# Patient Record
Sex: MALE | Race: WHITE | NOT HISPANIC OR LATINO
[De-identification: names, ages, dates, MRNs, and addresses within clinical notes are randomized per-mention and may not be internally consistent; named-entity substitution may affect disease eponyms.]

---

## 2017-01-16 ENCOUNTER — LABORATORY RESULT (OUTPATIENT)
Age: 62
End: 2017-01-16

## 2017-01-17 ENCOUNTER — APPOINTMENT (OUTPATIENT)
Dept: FAMILY MEDICINE | Facility: CLINIC | Age: 62
End: 2017-01-17

## 2017-01-17 VITALS — DIASTOLIC BLOOD PRESSURE: 80 MMHG | SYSTOLIC BLOOD PRESSURE: 140 MMHG

## 2017-01-17 DIAGNOSIS — Z00.00 ENCOUNTER FOR GENERAL ADULT MEDICAL EXAMINATION W/OUT ABNORMAL FINDINGS: ICD-10-CM

## 2017-01-19 ENCOUNTER — LABORATORY RESULT (OUTPATIENT)
Age: 62
End: 2017-01-19

## 2017-01-27 ENCOUNTER — APPOINTMENT (OUTPATIENT)
Dept: FAMILY MEDICINE | Facility: CLINIC | Age: 62
End: 2017-01-27

## 2017-01-31 ENCOUNTER — APPOINTMENT (OUTPATIENT)
Dept: FAMILY MEDICINE | Facility: CLINIC | Age: 62
End: 2017-01-31

## 2017-01-31 VITALS — DIASTOLIC BLOOD PRESSURE: 78 MMHG | RESPIRATION RATE: 18 BRPM | SYSTOLIC BLOOD PRESSURE: 144 MMHG | HEART RATE: 74 BPM

## 2017-02-10 LAB
PSA FREE FLD-MCNC: 11.2 %
PSA FREE SERPL-MCNC: 1.07 NG/ML
PSA SERPL-MCNC: 9.54 NG/ML

## 2017-02-15 ENCOUNTER — RX RENEWAL (OUTPATIENT)
Age: 62
End: 2017-02-15

## 2017-04-05 ENCOUNTER — APPOINTMENT (OUTPATIENT)
Dept: MRI IMAGING | Facility: CLINIC | Age: 62
End: 2017-04-05

## 2017-04-05 ENCOUNTER — OUTPATIENT (OUTPATIENT)
Dept: OUTPATIENT SERVICES | Facility: HOSPITAL | Age: 62
LOS: 1 days | End: 2017-04-05
Payer: COMMERCIAL

## 2017-04-05 DIAGNOSIS — R97.20 ELEVATED PROSTATE SPECIFIC ANTIGEN [PSA]: ICD-10-CM

## 2017-04-05 PROCEDURE — 72197 MRI PELVIS W/O & W/DYE: CPT

## 2017-04-05 PROCEDURE — A9585: CPT

## 2017-04-05 PROCEDURE — 82565 ASSAY OF CREATININE: CPT

## 2017-04-17 ENCOUNTER — RX RENEWAL (OUTPATIENT)
Age: 62
End: 2017-04-17

## 2017-06-12 ENCOUNTER — RX RENEWAL (OUTPATIENT)
Age: 62
End: 2017-06-12

## 2017-06-26 ENCOUNTER — RX RENEWAL (OUTPATIENT)
Age: 62
End: 2017-06-26

## 2017-07-23 ENCOUNTER — RX RENEWAL (OUTPATIENT)
Age: 62
End: 2017-07-23

## 2017-08-08 ENCOUNTER — APPOINTMENT (OUTPATIENT)
Dept: FAMILY MEDICINE | Facility: CLINIC | Age: 62
End: 2017-08-08

## 2017-08-08 ENCOUNTER — APPOINTMENT (OUTPATIENT)
Dept: FAMILY MEDICINE | Facility: CLINIC | Age: 62
End: 2017-08-08
Payer: COMMERCIAL

## 2017-08-08 ENCOUNTER — RX RENEWAL (OUTPATIENT)
Age: 62
End: 2017-08-08

## 2017-08-08 VITALS
HEART RATE: 70 BPM | BODY MASS INDEX: 20.76 KG/M2 | RESPIRATION RATE: 15 BRPM | OXYGEN SATURATION: 98 % | TEMPERATURE: 98.4 F | DIASTOLIC BLOOD PRESSURE: 78 MMHG | HEIGHT: 70 IN | WEIGHT: 145 LBS | SYSTOLIC BLOOD PRESSURE: 130 MMHG

## 2017-08-08 DIAGNOSIS — F41.9 ANXIETY DISORDER, UNSPECIFIED: ICD-10-CM

## 2017-08-08 PROCEDURE — 99214 OFFICE O/P EST MOD 30 MIN: CPT

## 2017-08-08 RX ORDER — ALPRAZOLAM 0.5 MG/1
0.5 TABLET ORAL
Qty: 120 | Refills: 0 | Status: DISCONTINUED | OUTPATIENT
Start: 2017-02-03 | End: 2017-08-08

## 2017-08-08 RX ORDER — ALPRAZOLAM 0.5 MG/1
0.5 TABLET ORAL
Qty: 120 | Refills: 0 | Status: DISCONTINUED | COMMUNITY
Start: 2017-03-01 | End: 2017-08-08

## 2017-08-09 LAB
25(OH)D3 SERPL-MCNC: 37.3 NG/ML
ALBUMIN SERPL ELPH-MCNC: 4.7 G/DL
ALP BLD-CCNC: 43 U/L
ALT SERPL-CCNC: 27 U/L
ANION GAP SERPL CALC-SCNC: 14 MMOL/L
APPEARANCE: CLEAR
AST SERPL-CCNC: 18 U/L
BASOPHILS # BLD AUTO: 0.05 K/UL
BASOPHILS NFR BLD AUTO: 0.5 %
BILIRUB SERPL-MCNC: 0.6 MG/DL
BILIRUBIN URINE: NEGATIVE
BLOOD URINE: NEGATIVE
BUN SERPL-MCNC: 19 MG/DL
CALCIUM SERPL-MCNC: 10.2 MG/DL
CHLORIDE SERPL-SCNC: 98 MMOL/L
CHOLEST SERPL-MCNC: 151 MG/DL
CHOLEST/HDLC SERPL: 1.6 RATIO
CO2 SERPL-SCNC: 25 MMOL/L
COLOR: YELLOW
CREAT SERPL-MCNC: 0.99 MG/DL
EOSINOPHIL # BLD AUTO: 0.04 K/UL
EOSINOPHIL NFR BLD AUTO: 0.4 %
GLUCOSE QUALITATIVE U: NORMAL MG/DL
GLUCOSE SERPL-MCNC: 110 MG/DL
HBA1C MFR BLD HPLC: 5.8 %
HCT VFR BLD CALC: 48 %
HDLC SERPL-MCNC: 97 MG/DL
HGB BLD-MCNC: 15.3 G/DL
IMM GRANULOCYTES NFR BLD AUTO: 0.2 %
KETONES URINE: NEGATIVE
LDLC SERPL CALC-MCNC: 36 MG/DL
LEUKOCYTE ESTERASE URINE: NEGATIVE
LYMPHOCYTES # BLD AUTO: 1.48 K/UL
LYMPHOCYTES NFR BLD AUTO: 16.2 %
MAN DIFF?: NORMAL
MCHC RBC-ENTMCNC: 29.3 PG
MCHC RBC-ENTMCNC: 31.9 GM/DL
MCV RBC AUTO: 92 FL
MONOCYTES # BLD AUTO: 0.68 K/UL
MONOCYTES NFR BLD AUTO: 7.5 %
NEUTROPHILS # BLD AUTO: 6.84 K/UL
NEUTROPHILS NFR BLD AUTO: 75.2 %
NITRITE URINE: NEGATIVE
PH URINE: 6.5
PLATELET # BLD AUTO: 532 K/UL
POTASSIUM SERPL-SCNC: 5.2 MMOL/L
PROT SERPL-MCNC: 7.8 G/DL
PROTEIN URINE: NEGATIVE MG/DL
PSA FREE FLD-MCNC: 11.2
PSA FREE SERPL-MCNC: 1.18 NG/ML
PSA SERPL-MCNC: 10.58 NG/ML
RBC # BLD: 5.22 M/UL
RBC # FLD: 14.2 %
SODIUM SERPL-SCNC: 137 MMOL/L
SPECIFIC GRAVITY URINE: 1.02
TRIGL SERPL-MCNC: 90 MG/DL
TSH SERPL-ACNC: 1.42 UIU/ML
UROBILINOGEN URINE: NORMAL MG/DL
WBC # FLD AUTO: 9.11 K/UL

## 2017-08-11 ENCOUNTER — OTHER (OUTPATIENT)
Age: 62
End: 2017-08-11

## 2017-08-14 LAB
TESTOST BND SERPL-MCNC: 7.9 PG/ML
TESTOST SERPL-MCNC: 477 NG/DL

## 2017-08-23 ENCOUNTER — RX RENEWAL (OUTPATIENT)
Age: 62
End: 2017-08-23

## 2017-08-25 ENCOUNTER — APPOINTMENT (OUTPATIENT)
Dept: FAMILY MEDICINE | Facility: CLINIC | Age: 62
End: 2017-08-25

## 2017-08-25 ENCOUNTER — RX RENEWAL (OUTPATIENT)
Age: 62
End: 2017-08-25

## 2017-09-20 ENCOUNTER — RX RENEWAL (OUTPATIENT)
Age: 62
End: 2017-09-20

## 2017-10-18 ENCOUNTER — NON-APPOINTMENT (OUTPATIENT)
Age: 62
End: 2017-10-18

## 2017-10-18 ENCOUNTER — RX RENEWAL (OUTPATIENT)
Age: 62
End: 2017-10-18

## 2017-10-18 ENCOUNTER — APPOINTMENT (OUTPATIENT)
Dept: CARDIOLOGY | Facility: CLINIC | Age: 62
End: 2017-10-18
Payer: COMMERCIAL

## 2017-10-18 VITALS
HEIGHT: 70 IN | WEIGHT: 145 LBS | OXYGEN SATURATION: 98 % | BODY MASS INDEX: 20.76 KG/M2 | RESPIRATION RATE: 17 BRPM | SYSTOLIC BLOOD PRESSURE: 144 MMHG | HEART RATE: 69 BPM | DIASTOLIC BLOOD PRESSURE: 85 MMHG

## 2017-10-18 VITALS — SYSTOLIC BLOOD PRESSURE: 150 MMHG | DIASTOLIC BLOOD PRESSURE: 85 MMHG

## 2017-10-18 DIAGNOSIS — Z82.49 FAMILY HISTORY OF ISCHEMIC HEART DISEASE AND OTHER DISEASES OF THE CIRCULATORY SYSTEM: ICD-10-CM

## 2017-10-18 PROCEDURE — 99215 OFFICE O/P EST HI 40 MIN: CPT

## 2017-10-18 PROCEDURE — 93000 ELECTROCARDIOGRAM COMPLETE: CPT

## 2017-11-15 ENCOUNTER — RX RENEWAL (OUTPATIENT)
Age: 62
End: 2017-11-15

## 2017-11-27 ENCOUNTER — APPOINTMENT (OUTPATIENT)
Dept: CARDIOLOGY | Facility: CLINIC | Age: 62
End: 2017-11-27
Payer: COMMERCIAL

## 2017-11-27 PROCEDURE — 93306 TTE W/DOPPLER COMPLETE: CPT

## 2017-12-11 ENCOUNTER — APPOINTMENT (OUTPATIENT)
Dept: CARDIOLOGY | Facility: CLINIC | Age: 62
End: 2017-12-11
Payer: COMMERCIAL

## 2017-12-11 DIAGNOSIS — R94.39 ABNORMAL RESULT OF OTHER CARDIOVASCULAR FUNCTION STUDY: ICD-10-CM

## 2017-12-11 DIAGNOSIS — I49.3 VENTRICULAR PREMATURE DEPOLARIZATION: ICD-10-CM

## 2017-12-11 PROCEDURE — 93015 CV STRESS TEST SUPVJ I&R: CPT

## 2017-12-18 ENCOUNTER — RX RENEWAL (OUTPATIENT)
Age: 62
End: 2017-12-18

## 2018-01-18 ENCOUNTER — RX RENEWAL (OUTPATIENT)
Age: 63
End: 2018-01-18

## 2018-02-20 ENCOUNTER — RX RENEWAL (OUTPATIENT)
Age: 63
End: 2018-02-20

## 2018-02-22 ENCOUNTER — RX RENEWAL (OUTPATIENT)
Age: 63
End: 2018-02-22

## 2018-03-22 ENCOUNTER — RX RENEWAL (OUTPATIENT)
Age: 63
End: 2018-03-22

## 2018-04-20 ENCOUNTER — RX RENEWAL (OUTPATIENT)
Age: 63
End: 2018-04-20

## 2018-05-21 ENCOUNTER — RX RENEWAL (OUTPATIENT)
Age: 63
End: 2018-05-21

## 2018-05-22 ENCOUNTER — RX RENEWAL (OUTPATIENT)
Age: 63
End: 2018-05-22

## 2018-06-18 ENCOUNTER — RX RENEWAL (OUTPATIENT)
Age: 63
End: 2018-06-18

## 2018-07-18 ENCOUNTER — RX RENEWAL (OUTPATIENT)
Age: 63
End: 2018-07-18

## 2018-08-15 ENCOUNTER — RX RENEWAL (OUTPATIENT)
Age: 63
End: 2018-08-15

## 2018-08-20 ENCOUNTER — RX RENEWAL (OUTPATIENT)
Age: 63
End: 2018-08-20

## 2018-08-22 ENCOUNTER — RX RENEWAL (OUTPATIENT)
Age: 63
End: 2018-08-22

## 2018-08-22 ENCOUNTER — CHART COPY (OUTPATIENT)
Age: 63
End: 2018-08-22

## 2018-09-11 ENCOUNTER — RX RENEWAL (OUTPATIENT)
Age: 63
End: 2018-09-11

## 2019-01-07 ENCOUNTER — RX RENEWAL (OUTPATIENT)
Age: 64
End: 2019-01-07

## 2019-02-25 ENCOUNTER — APPOINTMENT (OUTPATIENT)
Dept: ORTHOPEDIC SURGERY | Facility: CLINIC | Age: 64
End: 2019-02-25
Payer: COMMERCIAL

## 2019-02-25 VITALS
SYSTOLIC BLOOD PRESSURE: 145 MMHG | WEIGHT: 145 LBS | HEART RATE: 59 BPM | BODY MASS INDEX: 20.76 KG/M2 | HEIGHT: 70 IN | DIASTOLIC BLOOD PRESSURE: 76 MMHG

## 2019-02-25 DIAGNOSIS — Z86.39 PERSONAL HISTORY OF OTHER ENDOCRINE, NUTRITIONAL AND METABOLIC DISEASE: ICD-10-CM

## 2019-02-25 DIAGNOSIS — D47.3 ESSENTIAL (HEMORRHAGIC) THROMBOCYTHEMIA: ICD-10-CM

## 2019-02-25 DIAGNOSIS — Z86.2 PERSONAL HISTORY OF DISEASES OF THE BLOOD AND BLOOD-FORMING ORGANS AND CERTAIN DISORDERS INVOLVING THE IMMUNE MECHANISM: ICD-10-CM

## 2019-02-25 DIAGNOSIS — Z86.79 PERSONAL HISTORY OF OTHER DISEASES OF THE CIRCULATORY SYSTEM: ICD-10-CM

## 2019-02-25 DIAGNOSIS — M54.16 RADICULOPATHY, LUMBAR REGION: ICD-10-CM

## 2019-02-25 DIAGNOSIS — M54.42 LUMBAGO WITH SCIATICA, LEFT SIDE: ICD-10-CM

## 2019-02-25 PROCEDURE — 99244 OFF/OP CNSLTJ NEW/EST MOD 40: CPT

## 2019-02-25 NOTE — PHYSICAL EXAM
[de-identified] : He is fully alert and oriented with a normal mood and affect. He is in no acute distress. He is quite asthenic. There are no cutaneous abnormalities or palpable bony defects of the spine. There is no evidence of shortness of breath or respiratory distress. He has a normal gait including tiptoe and heel walking. There is no evidence of unsteadiness or spasticity. There is no paravertebral muscle spasm or trochanteric tenderness. There is a trace of sciatic notch and trochanteric tenderness on the left but not on the right. Forward flexion of the spine shows the fingertips reaching to within 10 inches of the floor limited by tight hamstrings. His lower extremity neurological examination revealed 3+ symmetrical knee jerks. Ankle jerks were 2-3+ with a few beats of clonus. Motor and sensory exam is normal straight leg raising is negative to 90°. Vascular exam shows no evidence of varicosities and there is no lymphedema. His knees have a full range of motion with normal stability. There are no cutaneous abnormalities of the upper lower extremities. His Babinskis were equivocal. Shoulder range of motion is full, painless and symmetrical. Range of motion of the cervical spine showed flexion rotation reaching to within 2 fingerbreadths of the chest. Extension is to 80° with rotation of 70° bilaterally and tilt of 25° bilaterally without apparent pain. His approach and the neurologic exam revealed 3+ symmetrical reflexes were normal motor power and sensation. His Diaz's was negative. [de-identified] : X-ray reports of the cervical spine describe multilevel degenerative changes and x-ray reports of the lumbar spine describe degenerative changes at L5-S1. No destructive changes are described.

## 2019-02-25 NOTE — HISTORY OF PRESENT ILLNESS
[de-identified] : This 63-year-old male is referred by Dr. Zheng Reynolds for evaluation of spine related symptoms. He has a long history of intermittent neck pain. Last October he started to exercise regularly on an exercise bike and with light weights and in early January had an onset of neck pain with radiation to both upper extremities worse on the right than on the left with some paresthesias in his fingers. He has not had night pain. There may be a change in his gait. There is no Valsalva effect. He does have multiple daily habits that will aggravate neck related pain.\par \par He had an episode of lower back pain 10 years ago and has been seeing a chiropractor on a regular basis. Currently he has lower back pain that he grades as a 5 and left anterior thigh pain that he grades is a 7 or an 8. There are no lower extremity neurologic symptoms. There is no Valsalva effect on his lower back. The back symptoms are worse standing for 2 or 3 minutes and worse walking. He has been attending the chiropractor on a regular basis.\par \par After the exam further questioning revealed that he has had a change in his handwriting and difficulty buttoning his shirts. [Pain Location] : pain [8] : a current pain level of 8/10

## 2019-02-25 NOTE — CONSULT LETTER
[Dear  ___] : Dear  [unfilled], [Sincerely,] : Sincerely, [FreeTextEntry1] : Thank you for referring this gentleman for evaluation of his current spine related symptoms.

## 2019-02-25 NOTE — DISCUSSION/SUMMARY
[Medication Risks Reviewed] : Medication risks reviewed [de-identified] : His recent laboratory work reveals thrombocytosis with an elevated potassium. He has had a persistently elevated PSA but has had multiple biopsies without evidence of any underlying oncologic process. His physical exam and history is consistent with cervical stenosis. He needs to stop or chiropractic treatment. We discussed activities he needs to avoid which may be aggravating his symptoms. He will use moist heat and to start on diclofenac 75 mg twice a day as a nonsteroidal anti-inflammatory. He will obtain an MRI of the cervical spine. I will see him for followup in 3 weeks and I have asked him to return with copies of his outside x-rays for my review at that point.

## 2019-02-25 NOTE — REASON FOR VISIT
[Initial Visit] : an initial visit for [Degenerative Joint Disease] : degenerative joint disease [Back Pain] : back pain [Neck Pain] : neck pain [Radiculopathy] : radiculopathy [FreeTextEntry2] : Neck pain radiating to the upper extremities and lower back pain radiating to the left leg

## 2019-02-27 ENCOUNTER — TRANSCRIPTION ENCOUNTER (OUTPATIENT)
Age: 64
End: 2019-02-27

## 2019-03-07 ENCOUNTER — RX RENEWAL (OUTPATIENT)
Age: 64
End: 2019-03-07

## 2019-03-11 ENCOUNTER — TRANSCRIPTION ENCOUNTER (OUTPATIENT)
Age: 64
End: 2019-03-11

## 2019-03-12 ENCOUNTER — APPOINTMENT (OUTPATIENT)
Dept: ORTHOPEDIC SURGERY | Facility: CLINIC | Age: 64
End: 2019-03-12
Payer: COMMERCIAL

## 2019-03-12 ENCOUNTER — CHART COPY (OUTPATIENT)
Age: 64
End: 2019-03-12

## 2019-03-12 DIAGNOSIS — M43.02 SPONDYLOLYSIS, CERVICAL REGION: ICD-10-CM

## 2019-03-12 DIAGNOSIS — M48.02 SPINAL STENOSIS, CERVICAL REGION: ICD-10-CM

## 2019-03-12 DIAGNOSIS — M54.2 CERVICALGIA: ICD-10-CM

## 2019-03-12 DIAGNOSIS — G54.9 NERVE ROOT AND PLEXUS DISORDER, UNSPECIFIED: ICD-10-CM

## 2019-03-12 DIAGNOSIS — M54.12 RADICULOPATHY, CERVICAL REGION: ICD-10-CM

## 2019-03-12 PROCEDURE — 99215 OFFICE O/P EST HI 40 MIN: CPT

## 2019-03-12 NOTE — DISCUSSION/SUMMARY
[Medication Risks Reviewed] : Medication risks reviewed [Surgical risks reviewed] : Surgical risks reviewed [de-identified] : He clearly has a cervical myeloradiculopathy related to stenosis. The only reasonable option is surgical management to prevent significant further deterioration in his function. I have explained a multilevel cervical laminectomy with a fusion. I also explained the current MRI images are not satisfactory to make a decision. He needs a much better MRI of his cervical spine. He also has lower back pain with radiation into the left anterior thigh and needs an MRI of the lumbar spine. I discussed with him that surgery on his neck will be necessary. We discussed some of the risks and complications. I discussed that the major goal of the surgery is to prevent further deterioration and he will not necessarily get any improvement in his function. We discussed a C5 palsy as a result of the decompression. He'll return for followup after he has had the appropriate MRI studies. I discussed that a CAT scan of the cervical spine may be necessary as well.

## 2019-03-12 NOTE — HISTORY OF PRESENT ILLNESS
[de-identified] : He has tolerated the diclofenac without problems but he has not seen any improvement. He comes in today and I now have copies of his x-rays and his MRI of the cervical spine. At the time was less than visit he had complaints of neck pain radiating down his arms and lower back pain radiating down his left leg. He had some difficulty with his gait as well as problems with his handwriting and buttoning his close. This was a clear picture for a cervical myelopathy.

## 2019-03-12 NOTE — REASON FOR VISIT
[Cervical Myelopathy/Myopathy] : cervical myelopathy/myopathy [Back Pain] : back pain [Neck Pain] : neck pain [Radiculopathy] : radiculopathy [Spouse] : spouse

## 2019-03-12 NOTE — PHYSICAL EXAM
[de-identified] : AP and lateral x-rays of the left hip are normal. X-rays of the lumbar spine reveal degenerative changes with a possible spondylolysis at L5-S1 and some degenerative changes at an upper level in the lumbar spine. MRI of the cervical spine is a standup open study. The T2 images are of reasonable quality but the rest of the images are very poor. The T2 images show degenerative changes with a disc ridge complex had C3-4 with signal change in the cord at that level and with prominent disc ridge complexes down to C6-7. The axial images are poor and not of much value interpreting the patient's pathology.

## 2019-03-13 ENCOUNTER — TRANSCRIPTION ENCOUNTER (OUTPATIENT)
Age: 64
End: 2019-03-13

## 2019-03-15 ENCOUNTER — TRANSCRIPTION ENCOUNTER (OUTPATIENT)
Age: 64
End: 2019-03-15

## 2019-03-16 ENCOUNTER — FORM ENCOUNTER (OUTPATIENT)
Age: 64
End: 2019-03-16

## 2019-03-17 ENCOUNTER — OUTPATIENT (OUTPATIENT)
Dept: OUTPATIENT SERVICES | Facility: HOSPITAL | Age: 64
LOS: 1 days | End: 2019-03-17
Payer: COMMERCIAL

## 2019-03-17 ENCOUNTER — APPOINTMENT (OUTPATIENT)
Dept: MRI IMAGING | Facility: CLINIC | Age: 64
End: 2019-03-17
Payer: COMMERCIAL

## 2019-03-17 DIAGNOSIS — Z00.8 ENCOUNTER FOR OTHER GENERAL EXAMINATION: ICD-10-CM

## 2019-03-17 PROCEDURE — 72148 MRI LUMBAR SPINE W/O DYE: CPT | Mod: 26

## 2019-03-17 PROCEDURE — 72148 MRI LUMBAR SPINE W/O DYE: CPT

## 2019-03-20 ENCOUNTER — CHART COPY (OUTPATIENT)
Age: 64
End: 2019-03-20

## 2019-03-21 ENCOUNTER — TRANSCRIPTION ENCOUNTER (OUTPATIENT)
Age: 64
End: 2019-03-21

## 2019-03-21 ENCOUNTER — CHART COPY (OUTPATIENT)
Age: 64
End: 2019-03-21

## 2019-03-22 ENCOUNTER — TRANSCRIPTION ENCOUNTER (OUTPATIENT)
Age: 64
End: 2019-03-22

## 2019-03-27 ENCOUNTER — TRANSCRIPTION ENCOUNTER (OUTPATIENT)
Age: 64
End: 2019-03-27

## 2019-04-03 ENCOUNTER — RX RENEWAL (OUTPATIENT)
Age: 64
End: 2019-04-03

## 2019-04-05 ENCOUNTER — APPOINTMENT (OUTPATIENT)
Dept: MRI IMAGING | Facility: CLINIC | Age: 64
End: 2019-04-05

## 2019-05-13 ENCOUNTER — APPOINTMENT (OUTPATIENT)
Dept: CARDIOLOGY | Facility: CLINIC | Age: 64
End: 2019-05-13
Payer: COMMERCIAL

## 2019-05-13 VITALS — SYSTOLIC BLOOD PRESSURE: 145 MMHG | DIASTOLIC BLOOD PRESSURE: 90 MMHG

## 2019-05-13 VITALS
OXYGEN SATURATION: 99 % | HEART RATE: 67 BPM | SYSTOLIC BLOOD PRESSURE: 163 MMHG | DIASTOLIC BLOOD PRESSURE: 91 MMHG | HEIGHT: 70 IN | RESPIRATION RATE: 17 BRPM | BODY MASS INDEX: 19.61 KG/M2 | WEIGHT: 137 LBS

## 2019-05-13 DIAGNOSIS — I77.810 THORACIC AORTIC ECTASIA: ICD-10-CM

## 2019-05-13 PROCEDURE — 93000 ELECTROCARDIOGRAM COMPLETE: CPT

## 2019-05-13 PROCEDURE — 99215 OFFICE O/P EST HI 40 MIN: CPT

## 2019-05-13 NOTE — DISCUSSION/SUMMARY
[Hyperlipidemia] : hyperlipidemia [Lipids Test Panel] : a fasting lipid profile [Aortic Aneurysm] : aortic aneurysm [Family History of CAD] : family history of CAD [Hypertension] : hypertension [Not Responding to Treatment] : not responding to treatment [Outpatient Evaluation] : outpatient evaluation [Ambulatory BP Monitoring] : ambulatory blood pressure monitoring [Echocardiogram] : echocardiogram [Continue] : continuing beta blockers [Smoking Cessation] : smoking cessation [Sodium Restriction] : sodium restriction [Stable] : stable [Responding to Treatment] : responding to treatment [None] : There are no changes in medication management [Medication Changes Per Orders] : as documented in orders [de-identified] : dilated Aorta on echo [de-identified] : off simvastatin for lfts [de-identified] : presumed h/o tachycardia [de-identified] : pt requests carotid u/s [de-identified] : consider ARB or norvasc [de-identified] : carotid doppler

## 2019-05-13 NOTE — REASON FOR VISIT
[Follow-Up - Clinic] : a clinic follow-up of [FreeTextEntry2] : cardiac f/u, s/p spinal surgery 2 weeks ago at [facility], zocor stopped for elevated LFTs, pt non compliant with f/u apt, last seen 2017 [FreeTextEntry1] : h/o palps, h/o enlarged aorta

## 2019-05-13 NOTE — PHYSICAL EXAM
[General Appearance - Well Developed] : well developed [Normal Appearance] : normal appearance [Well Groomed] : well groomed [General Appearance - Well Nourished] : well nourished [No Deformities] : no deformities [General Appearance - In No Acute Distress] : no acute distress [Normal Conjunctiva] : the conjunctiva exhibited no abnormalities [Eyelids - No Xanthelasma] : the eyelids demonstrated no xanthelasmas [Normal Oral Mucosa] : normal oral mucosa [No Oral Pallor] : no oral pallor [No Oral Cyanosis] : no oral cyanosis [Normal Jugular Venous A Waves Present] : normal jugular venous A waves present [Normal Jugular Venous V Waves Present] : normal jugular venous V waves present [No Jugular Venous [place] A Waves] : no jugular venous naranjo A waves [Respiration, Rhythm And Depth] : normal respiratory rhythm and effort [Exaggerated Use Of Accessory Muscles For Inspiration] : no accessory muscle use [Auscultation Breath Sounds / Voice Sounds] : lungs were clear to auscultation bilaterally [Abdomen Soft] : soft [Abdomen Tenderness] : non-tender [Abdomen Mass (___ Cm)] : no abdominal mass palpated [Abnormal Walk] : normal gait [Gait - Sufficient For Exercise Testing] : the gait was sufficient for exercise testing [Nail Clubbing] : no clubbing of the fingernails [Cyanosis, Localized] : no localized cyanosis [Petechial Hemorrhages (___cm)] : no petechial hemorrhages [Skin Color & Pigmentation] : normal skin color and pigmentation [] : no rash [No Venous Stasis] : no venous stasis [Skin Lesions] : no skin lesions [No Skin Ulcers] : no skin ulcer [No Xanthoma] : no  xanthoma was observed [Oriented To Time, Place, And Person] : oriented to person, place, and time [Affect] : the affect was normal [Mood] : the mood was normal [No Anxiety] : not feeling anxious [Normal Rate] : normal [Normal S1] : normal S1 [Normal S2] : normal S2 [No Murmur] : no murmurs heard [2+] : left 2+ [No Abnormalities] : the abdominal aorta was not enlarged and no bruit was heard [No Pitting Edema] : no pitting edema present [S3] : no S3 [Right Carotid Bruit] : no bruit heard over the right carotid [S4] : no S4 [Left Carotid Bruit] : no bruit heard over the left carotid [Right Femoral Bruit] : no bruit heard over the right femoral artery [Left Femoral Bruit] : no bruit heard over the left femoral artery

## 2019-06-03 ENCOUNTER — RX RENEWAL (OUTPATIENT)
Age: 64
End: 2019-06-03

## 2019-07-01 ENCOUNTER — RX RENEWAL (OUTPATIENT)
Age: 64
End: 2019-07-01

## 2019-09-26 ENCOUNTER — RX RENEWAL (OUTPATIENT)
Age: 64
End: 2019-09-26

## 2020-02-02 ENCOUNTER — RX RENEWAL (OUTPATIENT)
Age: 65
End: 2020-02-02

## 2020-05-06 ENCOUNTER — RX RENEWAL (OUTPATIENT)
Age: 65
End: 2020-05-06

## 2020-07-31 ENCOUNTER — TRANSCRIPTION ENCOUNTER (OUTPATIENT)
Age: 65
End: 2020-07-31

## 2020-08-09 ENCOUNTER — RX RENEWAL (OUTPATIENT)
Age: 65
End: 2020-08-09

## 2020-09-01 ENCOUNTER — APPOINTMENT (OUTPATIENT)
Dept: ENDOCRINOLOGY | Facility: CLINIC | Age: 65
End: 2020-09-01
Payer: COMMERCIAL

## 2020-09-01 VITALS
DIASTOLIC BLOOD PRESSURE: 90 MMHG | HEART RATE: 67 BPM | SYSTOLIC BLOOD PRESSURE: 130 MMHG | HEIGHT: 70 IN | WEIGHT: 139 LBS | OXYGEN SATURATION: 97 % | TEMPERATURE: 97.7 F | BODY MASS INDEX: 19.9 KG/M2

## 2020-09-01 PROCEDURE — 36415 COLL VENOUS BLD VENIPUNCTURE: CPT

## 2020-09-01 PROCEDURE — 99202 OFFICE O/P NEW SF 15 MIN: CPT | Mod: 25

## 2020-09-01 RX ORDER — TRAZODONE HYDROCHLORIDE 300 MG/1
TABLET ORAL
Refills: 0 | Status: DISCONTINUED | COMMUNITY
End: 2020-09-01

## 2020-09-01 RX ORDER — DICLOFENAC SODIUM 75 MG/1
75 TABLET, DELAYED RELEASE ORAL
Qty: 60 | Refills: 0 | Status: DISCONTINUED | COMMUNITY
Start: 2019-03-12 | End: 2020-09-01

## 2020-09-01 RX ORDER — DICLOFENAC SODIUM 75 MG/1
75 TABLET, DELAYED RELEASE ORAL
Qty: 60 | Refills: 0 | Status: DISCONTINUED | COMMUNITY
Start: 2019-02-25 | End: 2020-09-01

## 2020-09-01 RX ORDER — DICLOFENAC SODIUM 75 MG/1
75 TABLET, DELAYED RELEASE ORAL
Qty: 60 | Refills: 0 | Status: DISCONTINUED | COMMUNITY
Start: 2019-03-21 | End: 2020-09-01

## 2020-09-01 RX ORDER — ALPRAZOLAM 0.25 MG/1
0.25 TABLET ORAL
Qty: 120 | Refills: 0 | Status: DISCONTINUED | COMMUNITY
Start: 2017-02-03 | End: 2020-09-01

## 2020-09-02 NOTE — HISTORY OF PRESENT ILLNESS
[FreeTextEntry1] : 66 yo M with PMH hyperkalemia, HTN, HLD, SVT\par \par Has had hyperkalemia since at least 2019\par denies spironolactone use\par normal AM cortisol\par elevated PSA being evaluated by urology\par \par

## 2020-09-02 NOTE — HISTORY OF PRESENT ILLNESS
[FreeTextEntry1] : 64 yo M with PMH hyperkalemia, HTN, HLD, SVT\par \par Has had hyperkalemia since at least 2019\par denies spironolactone use\par normal AM cortisol\par elevated PSA being evaluated by urology\par \par  normal...

## 2020-09-02 NOTE — ASSESSMENT
[FreeTextEntry1] : 66 yo M with PMH hyperkalemia, HTN, HLD, SVT\par \par 1. Hyperkalemia - no endocrine etiology\par \par

## 2020-09-09 LAB
ACTH SER-ACNC: 35.2 PG/ML
ALBUMIN SERPL ELPH-MCNC: 4.8 G/DL
ALP BLD-CCNC: 46 U/L
ALT SERPL-CCNC: 27 U/L
ANION GAP SERPL CALC-SCNC: 13 MMOL/L
AST SERPL-CCNC: 22 U/L
BILIRUB SERPL-MCNC: 0.5 MG/DL
BUN SERPL-MCNC: 17 MG/DL
CALCIUM SERPL-MCNC: 10 MG/DL
CHLORIDE SERPL-SCNC: 99 MMOL/L
CO2 SERPL-SCNC: 25 MMOL/L
CORTIS SERPL-MCNC: 17.2 UG/DL
CREAT SERPL-MCNC: 0.99 MG/DL
GLUCOSE SERPL-MCNC: 86 MG/DL
POTASSIUM SERPL-SCNC: 5.1 MMOL/L
PROT SERPL-MCNC: 7.4 G/DL
SODIUM SERPL-SCNC: 137 MMOL/L
THYROGLOB AB SERPL-ACNC: <20 IU/ML
THYROPEROXIDASE AB SERPL IA-ACNC: 18.1 IU/ML

## 2020-09-21 LAB — CORTICOSTEROID BIND GLOBULIN: 2.6 MG/DL

## 2021-02-22 ENCOUNTER — TRANSCRIPTION ENCOUNTER (OUTPATIENT)
Age: 66
End: 2021-02-22

## 2021-04-20 ENCOUNTER — TRANSCRIPTION ENCOUNTER (OUTPATIENT)
Age: 66
End: 2021-04-20

## 2022-02-09 ENCOUNTER — NON-APPOINTMENT (OUTPATIENT)
Age: 67
End: 2022-02-09

## 2022-03-07 ENCOUNTER — NON-APPOINTMENT (OUTPATIENT)
Age: 67
End: 2022-03-07

## 2022-03-15 ENCOUNTER — APPOINTMENT (OUTPATIENT)
Dept: DERMATOLOGY | Facility: CLINIC | Age: 67
End: 2022-03-15
Payer: MEDICARE

## 2022-03-15 VITALS — BODY MASS INDEX: 21.7 KG/M2 | WEIGHT: 155 LBS | HEIGHT: 71 IN

## 2022-03-15 DIAGNOSIS — L82.0 INFLAMED SEBORRHEIC KERATOSIS: ICD-10-CM

## 2022-03-15 DIAGNOSIS — Z12.83 ENCOUNTER FOR SCREENING FOR MALIGNANT NEOPLASM OF SKIN: ICD-10-CM

## 2022-03-15 DIAGNOSIS — B07.9 VIRAL WART, UNSPECIFIED: ICD-10-CM

## 2022-03-15 DIAGNOSIS — D22.9 MELANOCYTIC NEVI, UNSPECIFIED: ICD-10-CM

## 2022-03-15 PROCEDURE — 17110 DESTRUCTION B9 LES UP TO 14: CPT

## 2022-03-15 PROCEDURE — 99203 OFFICE O/P NEW LOW 30 MIN: CPT | Mod: 25

## 2022-03-17 ENCOUNTER — APPOINTMENT (OUTPATIENT)
Dept: ORTHOPEDIC SURGERY | Facility: CLINIC | Age: 67
End: 2022-03-17
Payer: MEDICARE

## 2022-03-17 VITALS
DIASTOLIC BLOOD PRESSURE: 78 MMHG | HEIGHT: 71 IN | HEART RATE: 65 BPM | TEMPERATURE: 97.8 F | WEIGHT: 155 LBS | OXYGEN SATURATION: 100 % | SYSTOLIC BLOOD PRESSURE: 122 MMHG | BODY MASS INDEX: 21.7 KG/M2

## 2022-03-17 DIAGNOSIS — M70.62 TROCHANTERIC BURSITIS, LEFT HIP: ICD-10-CM

## 2022-03-17 DIAGNOSIS — Z98.1 ARTHRODESIS STATUS: ICD-10-CM

## 2022-03-17 PROCEDURE — 99213 OFFICE O/P EST LOW 20 MIN: CPT

## 2022-03-18 PROBLEM — M70.62 GREATER TROCHANTERIC BURSITIS OF LEFT HIP: Status: ACTIVE | Noted: 2022-03-18

## 2022-03-18 PROBLEM — Z98.1 S/P SPINAL FUSION: Status: ACTIVE | Noted: 2022-03-15

## 2022-03-18 NOTE — DISCUSSION/SUMMARY
[de-identified] : A lengthy discussion was had with the patient regarding his condition.  Rx PT for bursitis.  If no relief, recommend steroid injection L GT bursa.  Pt will call if he decides to try injection.  \par The patient's questions were sought and answered satisfactorily.\par

## 2022-03-18 NOTE — HISTORY OF PRESENT ILLNESS
[de-identified] : Patient is approximately 3 years status post ACDF C3-4, C5 7.  He denies significant neck pain.  States he was diagnosed with prostate cancer in 2020 and had treatment in 2021.  He contracted Covid in July 2020.  He was having to have spasms due to deconditioning.  They have since resolved.  Complains of tightness in his low back when he sleeping, his hips hurt rolling over.  During the day he does not have significant symptoms.  He sees a chiropractor.  Takes Tylenol as needed for pain.

## 2022-03-18 NOTE — PHYSICAL EXAM
[de-identified] : NVI.  +TTP to L GT bursa [de-identified] : Scoliosis x-rays dated 3/17/2022 demonstrate cervical instrumentation intact, no halos throughout the screws;  L5-S1 spondylolisthesis.

## 2022-04-26 ENCOUNTER — APPOINTMENT (OUTPATIENT)
Dept: PHYSICAL MEDICINE AND REHAB | Facility: CLINIC | Age: 67
End: 2022-04-26

## 2022-05-17 ENCOUNTER — APPOINTMENT (OUTPATIENT)
Dept: DERMATOLOGY | Facility: CLINIC | Age: 67
End: 2022-05-17

## 2024-01-19 ENCOUNTER — NON-APPOINTMENT (OUTPATIENT)
Age: 69
End: 2024-01-19

## 2024-02-06 ENCOUNTER — NON-APPOINTMENT (OUTPATIENT)
Age: 69
End: 2024-02-06

## 2024-02-14 ENCOUNTER — APPOINTMENT (OUTPATIENT)
Age: 69
End: 2024-02-14

## 2024-02-14 ENCOUNTER — NON-APPOINTMENT (OUTPATIENT)
Age: 69
End: 2024-02-14

## 2024-02-14 VITALS
DIASTOLIC BLOOD PRESSURE: 80 MMHG | WEIGHT: 138 LBS | TEMPERATURE: 97.3 F | HEART RATE: 73 BPM | BODY MASS INDEX: 19.32 KG/M2 | OXYGEN SATURATION: 98 % | SYSTOLIC BLOOD PRESSURE: 125 MMHG | HEIGHT: 71 IN | RESPIRATION RATE: 16 BRPM

## 2024-02-14 DIAGNOSIS — Z85.46 PERSONAL HISTORY OF MALIGNANT NEOPLASM OF PROSTATE: ICD-10-CM

## 2024-02-14 DIAGNOSIS — E55.9 VITAMIN D DEFICIENCY, UNSPECIFIED: ICD-10-CM

## 2024-02-14 DIAGNOSIS — I71.21 ANEURYSM OF THE ASCENDING AORTA, WITHOUT RUPTURE: ICD-10-CM

## 2024-02-14 DIAGNOSIS — C61 MALIGNANT NEOPLASM OF PROSTATE: ICD-10-CM

## 2024-02-14 DIAGNOSIS — I25.10 ATHEROSCLEROTIC HEART DISEASE OF NATIVE CORONARY ARTERY W/OUT ANGINA PECTORIS: ICD-10-CM

## 2024-02-14 DIAGNOSIS — R97.20 ELEVATED PROSTATE, SPECIFIC ANTIGEN [PSA]: ICD-10-CM

## 2024-02-14 PROCEDURE — 93000 ELECTROCARDIOGRAM COMPLETE: CPT

## 2024-02-14 PROCEDURE — 36415 COLL VENOUS BLD VENIPUNCTURE: CPT

## 2024-02-14 PROCEDURE — 99204 OFFICE O/P NEW MOD 45 MIN: CPT

## 2024-02-14 RX ORDER — SIMVASTATIN 80 MG/1
TABLET, FILM COATED ORAL
Refills: 0 | Status: ACTIVE | COMMUNITY

## 2024-02-14 RX ORDER — TICAGRELOR 60 MG/1
TABLET ORAL
Refills: 0 | Status: ACTIVE | COMMUNITY

## 2024-02-14 NOTE — PLAN
[FreeTextEntry1] : fasting labs today.  declined vaccines.   colonoscopy after spring .  CAD: f/u per cardiology. continue present management. Prostate cancer surveillance per urology.

## 2024-02-14 NOTE — HISTORY OF PRESENT ILLNESS
[FreeTextEntry8] : Here to establish. He had viral illness last week and it has resolved. He has h/o cervical stenosis, heart attack and Prostate cancer. He is AAOX3,NAD. No chest pain, SOB, chills, palpitations. he is under care of urologist, cardiologist. He had heart attack in 05/14/23 due to chest pressure and went to [facility] and had coronary stent placed and is taking DAPT.  He is working for insurance company.

## 2024-02-14 NOTE — HEALTH RISK ASSESSMENT
[No] : In the past 12 months have you used drugs other than those required for medical reasons? No [No falls in past year] : Patient reported no falls in the past year [0] : 2) Feeling down, depressed, or hopeless: Not at all (0) [PHQ-2 Negative - No further assessment needed] : PHQ-2 Negative - No further assessment needed [de-identified] : goes to cardiac rehab two days a week  [de-identified] : regular [[id number]] : 0 [Never] : Never

## 2024-02-15 ENCOUNTER — TRANSCRIPTION ENCOUNTER (OUTPATIENT)
Age: 69
End: 2024-02-15

## 2024-02-15 LAB
ALBUMIN SERPL ELPH-MCNC: 4.8 G/DL
ALP BLD-CCNC: 51 U/L
ALT SERPL-CCNC: 44 U/L
ANION GAP SERPL CALC-SCNC: 16 MMOL/L
APO A-I SERPL-MCNC: 223 MG/DL
APO B SERPL-MCNC: 34 MG/DL
APPEARANCE: CLEAR
AST SERPL-CCNC: 35 U/L
BASOPHILS # BLD AUTO: 0.06 K/UL
BASOPHILS NFR BLD AUTO: 0.9 %
BILIRUB SERPL-MCNC: 0.8 MG/DL
BILIRUBIN URINE: NEGATIVE
BLOOD URINE: NEGATIVE
BUN SERPL-MCNC: 16 MG/DL
CALCIUM SERPL-MCNC: 10.3 MG/DL
CHLORIDE SERPL-SCNC: 98 MMOL/L
CHOLEST SERPL-MCNC: 144 MG/DL
CO2 SERPL-SCNC: 23 MMOL/L
COLOR: YELLOW
CREAT SERPL-MCNC: 0.85 MG/DL
CRP SERPL HS-MCNC: 0.58 MG/L
EGFR: 95 ML/MIN/1.73M2
EOSINOPHIL # BLD AUTO: 0.04 K/UL
EOSINOPHIL NFR BLD AUTO: 0.6 %
ESTIMATED AVERAGE GLUCOSE: 117 MG/DL
GLUCOSE QUALITATIVE U: NEGATIVE MG/DL
GLUCOSE SERPL-MCNC: 99 MG/DL
HBA1C MFR BLD HPLC: 5.7 %
HCT VFR BLD CALC: 46.9 %
HCYS SERPL-MCNC: 10.1 UMOL/L
HDLC SERPL-MCNC: 95 MG/DL
HGB BLD-MCNC: 15.8 G/DL
IMM GRANULOCYTES NFR BLD AUTO: 0.3 %
KETONES URINE: 15 MG/DL
LDLC SERPL CALC-MCNC: 36 MG/DL
LEUKOCYTE ESTERASE URINE: NEGATIVE
LYMPHOCYTES # BLD AUTO: 0.84 K/UL
LYMPHOCYTES NFR BLD AUTO: 13 %
MAGNESIUM SERPL-MCNC: 2.2 MG/DL
MAN DIFF?: NORMAL
MCHC RBC-ENTMCNC: 30 PG
MCHC RBC-ENTMCNC: 33.7 GM/DL
MCV RBC AUTO: 89.2 FL
MONOCYTES # BLD AUTO: 0.58 K/UL
MONOCYTES NFR BLD AUTO: 9 %
NEUTROPHILS # BLD AUTO: 4.93 K/UL
NEUTROPHILS NFR BLD AUTO: 76.2 %
NITRITE URINE: NEGATIVE
NONHDLC SERPL-MCNC: 49 MG/DL
PH URINE: 6
PLATELET # BLD AUTO: 510 K/UL
POTASSIUM SERPL-SCNC: 5 MMOL/L
PROT SERPL-MCNC: 7.8 G/DL
PROTEIN URINE: NEGATIVE MG/DL
RBC # BLD: 5.26 M/UL
RBC # FLD: 14.5 %
SODIUM SERPL-SCNC: 136 MMOL/L
SPECIFIC GRAVITY URINE: 1.01
TRIGL SERPL-MCNC: 62 MG/DL
TSH SERPL-ACNC: 1.6 UIU/ML
URATE SERPL-MCNC: 5.9 MG/DL
UROBILINOGEN URINE: 0.2 MG/DL
WBC # FLD AUTO: 6.47 K/UL

## 2024-02-16 ENCOUNTER — TRANSCRIPTION ENCOUNTER (OUTPATIENT)
Age: 69
End: 2024-02-16

## 2024-02-17 LAB — APO LP(A) SERPL-MCNC: 28.3 NMOL/L

## 2024-02-27 ENCOUNTER — TRANSCRIPTION ENCOUNTER (OUTPATIENT)
Age: 69
End: 2024-02-27

## 2025-02-27 ENCOUNTER — APPOINTMENT (OUTPATIENT)
Dept: FAMILY MEDICINE | Facility: CLINIC | Age: 70
End: 2025-02-27
Payer: MEDICARE

## 2025-02-27 VITALS
DIASTOLIC BLOOD PRESSURE: 80 MMHG | OXYGEN SATURATION: 99 % | BODY MASS INDEX: 21.48 KG/M2 | TEMPERATURE: 97.4 F | WEIGHT: 145 LBS | RESPIRATION RATE: 17 BRPM | SYSTOLIC BLOOD PRESSURE: 136 MMHG | HEIGHT: 69 IN | HEART RATE: 69 BPM

## 2025-02-27 DIAGNOSIS — Z86.79 PERSONAL HISTORY OF OTHER DISEASES OF THE CIRCULATORY SYSTEM: ICD-10-CM

## 2025-02-27 DIAGNOSIS — I25.10 ATHEROSCLEROTIC HEART DISEASE OF NATIVE CORONARY ARTERY W/OUT ANGINA PECTORIS: ICD-10-CM

## 2025-02-27 DIAGNOSIS — I77.810 THORACIC AORTIC ECTASIA: ICD-10-CM

## 2025-02-27 DIAGNOSIS — Z86.39 PERSONAL HISTORY OF OTHER ENDOCRINE, NUTRITIONAL AND METABOLIC DISEASE: ICD-10-CM

## 2025-02-27 DIAGNOSIS — Z00.00 ENCOUNTER FOR GENERAL ADULT MEDICAL EXAMINATION W/OUT ABNORMAL FINDINGS: ICD-10-CM

## 2025-02-27 DIAGNOSIS — E55.9 VITAMIN D DEFICIENCY, UNSPECIFIED: ICD-10-CM

## 2025-02-27 DIAGNOSIS — C61 MALIGNANT NEOPLASM OF PROSTATE: ICD-10-CM

## 2025-02-27 PROCEDURE — G0439: CPT

## 2025-02-27 PROCEDURE — 36415 COLL VENOUS BLD VENIPUNCTURE: CPT

## 2025-03-07 LAB
25(OH)D3 SERPL-MCNC: 48.5 NG/ML
ALBUMIN SERPL ELPH-MCNC: 4.7 G/DL
ALP BLD-CCNC: 52 U/L
ALT SERPL-CCNC: 38 U/L
ANION GAP SERPL CALC-SCNC: 11 MMOL/L
APPEARANCE: CLEAR
AST SERPL-CCNC: 26 U/L
BASOPHILS # BLD AUTO: 0.1 K/UL
BASOPHILS NFR BLD AUTO: 1.7 %
BILIRUB SERPL-MCNC: 0.9 MG/DL
BILIRUBIN URINE: NEGATIVE
BLOOD URINE: NEGATIVE
BUN SERPL-MCNC: 21 MG/DL
CALCIUM SERPL-MCNC: 10.2 MG/DL
CHLORIDE SERPL-SCNC: 102 MMOL/L
CHOLEST SERPL-MCNC: 148 MG/DL
CK SERPL-CCNC: 163 U/L
CO2 SERPL-SCNC: 23 MMOL/L
COLOR: YELLOW
CREAT SERPL-MCNC: 0.91 MG/DL
EGFR: 92 ML/MIN/1.73M2
EOSINOPHIL # BLD AUTO: 0.1 K/UL
EOSINOPHIL NFR BLD AUTO: 1.7 %
ESTIMATED AVERAGE GLUCOSE: 120 MG/DL
GLUCOSE QUALITATIVE U: NEGATIVE MG/DL
GLUCOSE SERPL-MCNC: 98 MG/DL
HBA1C MFR BLD HPLC: 5.8 %
HCT VFR BLD CALC: 48.2 %
HDLC SERPL-MCNC: 96 MG/DL
HGB BLD-MCNC: 15.7 G/DL
IMM GRANULOCYTES NFR BLD AUTO: 0.2 %
KETONES URINE: NEGATIVE MG/DL
LDLC SERPL CALC-MCNC: 39 MG/DL
LEUKOCYTE ESTERASE URINE: NEGATIVE
LYMPHOCYTES # BLD AUTO: 1.1 K/UL
LYMPHOCYTES NFR BLD AUTO: 18.6 %
MAN DIFF?: NORMAL
MCHC RBC-ENTMCNC: 29.3 PG
MCHC RBC-ENTMCNC: 32.6 G/DL
MCV RBC AUTO: 90.1 FL
MONOCYTES # BLD AUTO: 0.56 K/UL
MONOCYTES NFR BLD AUTO: 9.5 %
NEUTROPHILS # BLD AUTO: 4.05 K/UL
NEUTROPHILS NFR BLD AUTO: 68.3 %
NITRITE URINE: NEGATIVE
NONHDLC SERPL-MCNC: 52 MG/DL
PH URINE: 6
PLATELET # BLD AUTO: 499 K/UL
POTASSIUM SERPL-SCNC: 5 MMOL/L
PROT SERPL-MCNC: 7.4 G/DL
PROTEIN URINE: NEGATIVE MG/DL
RBC # BLD: 5.35 M/UL
RBC # FLD: 13.9 %
SODIUM SERPL-SCNC: 136 MMOL/L
SPECIFIC GRAVITY URINE: 1.01
TRIGL SERPL-MCNC: 58 MG/DL
TSH SERPL-ACNC: 1.65 UIU/ML
UROBILINOGEN URINE: 0.2 MG/DL
WBC # FLD AUTO: 5.92 K/UL